# Patient Record
Sex: MALE | Race: BLACK OR AFRICAN AMERICAN
[De-identification: names, ages, dates, MRNs, and addresses within clinical notes are randomized per-mention and may not be internally consistent; named-entity substitution may affect disease eponyms.]

---

## 2021-02-06 ENCOUNTER — HOSPITAL ENCOUNTER (INPATIENT)
Dept: HOSPITAL 72 - EMR | Age: 81
LOS: 2 days | Discharge: LEFT BEFORE DISCHARGE | DRG: 194 | End: 2021-02-08
Payer: SELF-PAY

## 2021-02-06 VITALS — SYSTOLIC BLOOD PRESSURE: 125 MMHG | DIASTOLIC BLOOD PRESSURE: 80 MMHG

## 2021-02-06 VITALS — DIASTOLIC BLOOD PRESSURE: 83 MMHG | SYSTOLIC BLOOD PRESSURE: 128 MMHG

## 2021-02-06 VITALS — SYSTOLIC BLOOD PRESSURE: 130 MMHG | DIASTOLIC BLOOD PRESSURE: 90 MMHG

## 2021-02-06 VITALS — HEIGHT: 66 IN | BODY MASS INDEX: 31.34 KG/M2 | WEIGHT: 195 LBS

## 2021-02-06 DIAGNOSIS — Z88.8: ICD-10-CM

## 2021-02-06 DIAGNOSIS — J18.9: Primary | ICD-10-CM

## 2021-02-06 DIAGNOSIS — I24.9: ICD-10-CM

## 2021-02-06 DIAGNOSIS — I25.10: ICD-10-CM

## 2021-02-06 DIAGNOSIS — Z79.82: ICD-10-CM

## 2021-02-06 DIAGNOSIS — I10: ICD-10-CM

## 2021-02-06 DIAGNOSIS — R09.02: ICD-10-CM

## 2021-02-06 DIAGNOSIS — Z95.5: ICD-10-CM

## 2021-02-06 DIAGNOSIS — N40.0: ICD-10-CM

## 2021-02-06 LAB
ADD MANUAL DIFF: NO
ALBUMIN SERPL-MCNC: 3 G/DL (ref 3.4–5)
ALBUMIN/GLOB SERPL: 0.9 {RATIO} (ref 1–2.7)
ALP SERPL-CCNC: 82 U/L (ref 46–116)
ALT SERPL-CCNC: 19 U/L (ref 12–78)
ANION GAP SERPL CALC-SCNC: 5 MMOL/L (ref 5–15)
APTT BLD: 21 SEC (ref 23–33)
AST SERPL-CCNC: 15 U/L (ref 15–37)
BASOPHILS NFR BLD AUTO: 1.8 % (ref 0–2)
BILIRUB SERPL-MCNC: 0.6 MG/DL (ref 0.2–1)
BUN SERPL-MCNC: 16 MG/DL (ref 7–18)
CALCIUM SERPL-MCNC: 9.5 MG/DL (ref 8.5–10.1)
CHLORIDE SERPL-SCNC: 109 MMOL/L (ref 98–107)
CO2 SERPL-SCNC: 29 MMOL/L (ref 21–32)
CREAT SERPL-MCNC: 1.2 MG/DL (ref 0.55–1.3)
EOSINOPHIL NFR BLD AUTO: 0.9 % (ref 0–3)
ERYTHROCYTE [DISTWIDTH] IN BLOOD BY AUTOMATED COUNT: 17.2 % (ref 11.6–14.8)
GLOBULIN SER-MCNC: 3.2 G/DL
HCT VFR BLD CALC: 35.5 % (ref 42–52)
HGB BLD-MCNC: 10.6 G/DL (ref 14.2–18)
INR PPP: 1 (ref 0.9–1.1)
LYMPHOCYTES NFR BLD AUTO: 26.8 % (ref 20–45)
MCV RBC AUTO: 77 FL (ref 80–99)
MONOCYTES NFR BLD AUTO: 9.3 % (ref 1–10)
NEUTROPHILS NFR BLD AUTO: 61.2 % (ref 45–75)
PLATELET # BLD: 249 K/UL (ref 150–450)
POTASSIUM SERPL-SCNC: 4.2 MMOL/L (ref 3.5–5.1)
RBC # BLD AUTO: 4.58 M/UL (ref 4.7–6.1)
SODIUM SERPL-SCNC: 143 MMOL/L (ref 136–145)
WBC # BLD AUTO: 6.8 K/UL (ref 4.8–10.8)

## 2021-02-06 PROCEDURE — 96372 THER/PROPH/DIAG INJ SC/IM: CPT

## 2021-02-06 PROCEDURE — 82962 GLUCOSE BLOOD TEST: CPT

## 2021-02-06 PROCEDURE — 85730 THROMBOPLASTIN TIME PARTIAL: CPT

## 2021-02-06 PROCEDURE — 85025 COMPLETE CBC W/AUTO DIFF WBC: CPT

## 2021-02-06 PROCEDURE — 83690 ASSAY OF LIPASE: CPT

## 2021-02-06 PROCEDURE — 84484 ASSAY OF TROPONIN QUANT: CPT

## 2021-02-06 PROCEDURE — 80053 COMPREHEN METABOLIC PANEL: CPT

## 2021-02-06 PROCEDURE — 36415 COLL VENOUS BLD VENIPUNCTURE: CPT

## 2021-02-06 PROCEDURE — 93005 ELECTROCARDIOGRAM TRACING: CPT

## 2021-02-06 PROCEDURE — 83880 ASSAY OF NATRIURETIC PEPTIDE: CPT

## 2021-02-06 PROCEDURE — 96374 THER/PROPH/DIAG INJ IV PUSH: CPT

## 2021-02-06 PROCEDURE — 71045 X-RAY EXAM CHEST 1 VIEW: CPT

## 2021-02-06 PROCEDURE — 93306 TTE W/DOPPLER COMPLETE: CPT

## 2021-02-06 PROCEDURE — 99285 EMERGENCY DEPT VISIT HI MDM: CPT

## 2021-02-06 PROCEDURE — 85379 FIBRIN DEGRADATION QUANT: CPT

## 2021-02-06 PROCEDURE — 85610 PROTHROMBIN TIME: CPT

## 2021-02-06 RX ADMIN — HEPARIN SODIUM SCH UNITS: 5000 INJECTION INTRAVENOUS; SUBCUTANEOUS at 21:42

## 2021-02-06 RX ADMIN — SODIUM CHLORIDE SCH MLS/HR: 0.9 INJECTION INTRAVENOUS at 22:53

## 2021-02-06 RX ADMIN — HYDROCODONE BITARTRATE AND ACETAMINOPHEN ONE TAB: 5; 325 TABLET ORAL at 18:53

## 2021-02-06 RX ADMIN — DIPHENHYDRAMINE HYDROCHLORIDE PRN MG: 50 INJECTION INTRAMUSCULAR; INTRAVENOUS at 21:44

## 2021-02-06 RX ADMIN — HYDROCODONE BITARTRATE AND ACETAMINOPHEN ONE TAB: 5; 325 TABLET ORAL at 19:29

## 2021-02-06 RX ADMIN — AZITHROMYCIN DIHYDRATE SCH MLS/HR: 500 INJECTION, POWDER, LYOPHILIZED, FOR SOLUTION INTRAVENOUS at 22:54

## 2021-02-06 NOTE — DIAGNOSTIC IMAGING REPORT
EXAM:

  XR Chest, 1 View

 

CLINICAL HISTORY:

  CP

 

TECHNIQUE:

  Frontal view of the chest.

 

COMPARISON:

  No relevant prior studies available.

 

FINDINGS:

  Lungs:  Left basilar atelectasis versus infiltrate.

  Pleural space:  No pleural effusion. No pneumothorax.

  Heart:  Unremarkable.  No cardiomegaly.

 

IMPRESSION:     

  Left basilar airspace opacity the differential for which is atelectasis,

 infection, and aspiration.

## 2021-02-06 NOTE — EMERGENCY ROOM REPORT
History of Present Illness


General


Chief Complaint:  Chest Pain


Source:  Patient





Present Illness


HPI


Patient is an 80-year-old male presents for increased chest discomfort.  Reports

having onset of symptoms approximately 1 hour prior to arrival.  Prior history 

of coronary artery disease.  He states he is currently a smoker.  States he has 

been having pain for approximately 1 hour.  Reports having radiation to his neck

and jaw.  Reports having previous cardiac stent.  His primary care physician is 

in [place].  Denies any vomiting or diarrhea.  No recent cough.  Had 

negative coronavirus testing prior to flying.  Previous history of DVT and 

pulmonary embolism.


Allergies:  


Coded Allergies:  


     IODINE (Verified  Allergy, Unknown, 2/6/21)


     KETOROLAC (Verified  Allergy, Unknown, 2/6/21)


     NITROGLYCERIN (Verified  Allergy, Unknown, 2/6/21)





COVID-19 Screening


Contact w/high risk pt:  No


Experienced COVID-19 symptoms?:  No


COVID-19 Testing performed PTA:  No





Patient History


Past Medical History:  see triage record


Reviewed Nursing Documentation:  PMH: Agreed; PSxH: Agreed





Nursing Documentation-PMH


Past Medical History:  No History, Except For


Hx Cardiac Problems:  Yes - PE, 2 HEART MI, 2 STENTS


Hx Pacemaker:  No - URINARY ISSUES





Review of Systems


All Other Systems:  negative except mentioned in HPI





Physical Exam





Vital Signs








  Date Time  Temp Pulse Resp B/P (MAP) Pulse Ox O2 Delivery O2 Flow Rate FiO2


 


2/6/21 16:49 98.2 86 16 130/97 (108) 95 Room Air  








Sp02 EP Interpretation:  reviewed, normal


General Appearance:  normal inspection, well appearing, no apparent distress, 

alert, GCS 15, non-toxic


Head:  atraumatic


ENT:  normal ENT inspection, hearing grossly normal, normal voice


Neck:  normal inspection, full range of motion, supple, no bony tend


Respiratory:  normal inspection, lungs clear, normal breath sounds, no 

respiratory distress, no retraction, no wheezing


Cardiovascular #1:  regular rate, rhythm, no edema


Gastrointestinal:  normal inspection, normal bowel sounds, non tender, soft, no 

guarding, no hernia


Genitourinary:  no CVA tenderness


Musculoskeletal:  normal inspection, back normal, normal range of motion


Neurologic:  alert, motor strength/tone normal, CNs III-XII nml as tested, 

oriented x3, responsive, speech normal, normal inspection


Psychiatric:  normal inspection, judgement/insight normal, mood/affect normal


Skin:  no rash, warm/dry


Lymphatic:  normal inspection





Medical Decision Making


Diagnostic Impression:  


   Primary Impression:  


   Chest pain


   Additional Impressions:  


   Pneumonitis


   Elevated d-dimer


ER Course


Patient presents for chest pain.  Differential diagnosis include was not limited

to myocardial infarction, pulmonary embolism, coronavirus infection, pneumonia 

among others.  Because of complexity of patient's case laboratory tests and 

imaging studies were ordered.Initial laboratory testing was unremarkable.  Chest

x-ray read by radiology showed questionable left-sided infiltrate versus 

atelectasis.  EKG interpreted by me showed normal sinus rhythm without acute ST 

or T wave changes.  Troponin was 0.  Patient was given Lovenox as well as 

Plavix.  Dr. Freddy Tipton was contacted for inpatient management due to the 

patient's concerning symptoms consistent with possible cardiac chest pain.  

Patient does have multiple medication allergies to medications which are 

including nitroglycerin and Toradol.  Patient does not appear to have any 

evidence of acute distress despite complaints of pain.  Covid testing was sent 

due to patient's recent travel.  D-dimer was slightly elevated.  Patient began 

requesting Dilaudid which does not appear to be indicated given the patient's 

level of distress and symptomatology.





Labs








Test


 2/6/21


17:14 2/6/21


17:43


 


White Blood Count


 6.8 K/UL


(4.8-10.8) 





 


Red Blood Count


 4.58 M/UL


(4.70-6.10) 





 


Hemoglobin


 10.6 G/DL


(14.2-18.0) 





 


Hematocrit


 35.5 %


(42.0-52.0) 





 


Mean Corpuscular Volume 77 FL (80-99)  


 


Mean Corpuscular Hemoglobin


 23.2 PG


(27.0-31.0) 





 


Mean Corpuscular Hemoglobin


Concent 29.9 G/DL


(32.0-36.0) 





 


Red Cell Distribution Width


 17.2 %


(11.6-14.8) 





 


Platelet Count


 249 K/UL


(150-450) 





 


Mean Platelet Volume


 6.4 FL


(6.5-10.1) 





 


Neutrophils (%) (Auto)


 61.2 %


(45.0-75.0) 





 


Lymphocytes (%) (Auto)


 26.8 %


(20.0-45.0) 





 


Monocytes (%) (Auto)


 9.3 %


(1.0-10.0) 





 


Eosinophils (%) (Auto)


 0.9 %


(0.0-3.0) 





 


Basophils (%) (Auto)


 1.8 %


(0.0-2.0) 





 


Prothrombin Time


 10.7 SEC


(9.30-11.50) 





 


Prothromb Time International


Ratio 1.0 (0.9-1.1) 


 





 


Activated Partial


Thromboplast Time 21 SEC (23-33) 


 





 


D-Dimer


 1.98 mg/L FEU


(0.00-0.49) 





 


Sodium Level


 143 MMOL/L


(136-145) 





 


Potassium Level


 4.2 MMOL/L


(3.5-5.1) 





 


Chloride Level


 109 MMOL/L


() 





 


Carbon Dioxide Level


 29 MMOL/L


(21-32) 





 


Anion Gap


 5 mmol/L


(5-15) 





 


Blood Urea Nitrogen


 16 mg/dL


(7-18) 





 


Creatinine


 1.2 MG/DL


(0.55-1.30) 





 


Estimat Glomerular Filtration


Rate > 60 mL/min


(>60) 





 


Glucose Level


 109 MG/DL


() 





 


Calcium Level


 9.5 MG/DL


(8.5-10.1) 





 


Total Bilirubin


 0.6 MG/DL


(0.2-1.0) 





 


Aspartate Amino Transf


(AST/SGOT) 15 U/L (15-37) 


 





 


Alanine Aminotransferase


(ALT/SGPT) 19 U/L (12-78) 


 





 


Alkaline Phosphatase


 82 U/L


() 





 


Troponin I


 0.000 ng/mL


(0.000-0.056) 





 


Pro-B-Type Natriuretic Peptide


 65 pg/mL


(0-125) 





 


Total Protein


 6.2 G/DL


(6.4-8.2) 





 


Albumin


 3.0 G/DL


(3.4-5.0) 





 


Globulin 3.2 g/dL  


 


Albumin/Globulin Ratio 0.9 (1.0-2.7)  


 


Lipase


 69 U/L


() 





 


POC Whole Blood Glucose


 


 94 MG/DL


()








EKG Diagnostic Results


Rate:  normal


Rhythm:  NSR


ST Segments:  no acute changes





Last Vital Signs








  Date Time  Temp Pulse Resp B/P (MAP) Pulse Ox O2 Delivery O2 Flow Rate FiO2


 


2/6/21 16:49 98.2 86 16 130/97 (108) 95 Room Air  








Status:  unchanged


Disposition:  ADMITTED AS INPATIENT


Condition:  Stable











Mj Zavala MD                Feb 6, 2021 16:59

## 2021-02-06 NOTE — NUR
ED Nurse Note: Recieved care from Finesse BUENROSTRO. Pt is resting, still co of chest 
pain. Pt has stable vitals as documented on RA.

## 2021-02-06 NOTE — HISTORY AND PHYSICAL REPORT
DATE OF ADMISSION:  02/06/2021

HISTORY OF PRESENT ILLNESS:  This is an 80-year-old  male

who came to the emergency room for having chest pain and shortness of

breath for one day prior to admission.  The [place]

and he was feeling short of breath for one day prior to admission.  He

also has chest discomfort.



PAST MEDICAL HISTORY:  Coronary artery disease, status post stent

placement, hyperlipidemia, BPH.



MEDICATIONS:  He is taking aspirin, atenolol, Flomax.



ALLERGIES:  NKA.



FAMILY HISTORY:  Noncontributory.



SOCIAL HISTORY:  Lives in the [place].  .  No smoking.  No

drinking.



REVIEW OF SYSTEMS:  Generalized weakness, recurrent chest pain, asking pain

medication.



PHYSICAL EXAMINATION:

VITAL SIGNS:  Blood pressure is 124/70, pulse 84, respirations 18, no

fever.

HEENT:  NAD.

CHEST:  Bilaterally clear.

CARDIOVASCULAR:  Regular rhythm.  No gallop.  No murmur.

ABDOMEN:  Soft.  Positive bowel sounds.  Nontender.

EXTREMITIES:  No edema.

GENITOURINARY:  Deferred.



LABORATORY DATA:  His troponins are negative.  EKG is normal.  His chest

x-ray is showing pneumonia.



ASSESSMENT AND PLAN:

1. Chest pain.

2. Hypoxia.

3. Acute coronary syndrome.

4. Hypertension.

5. BPH.

6. Pain syndrome.



We will admit on a telemetry bed, rule out of MI, consider Cardiology

consult, continue aspirin and beta-blocker, added Dilaudid for severe

pain, also added antibiotic Rocephin and Zithromax, consider Pulmonary

consult as well as Cardiology consult.









  ______________________________________________

  Freddy Tipton M.D.





DR:  Nereida

D:  02/06/2021 20:56

T:  02/06/2021 21:28

JOB#:  [id number]

CC:

## 2021-02-06 NOTE — NUR
NURSE NOTES:

Patient received from Atif BUENROSTRO. Brought in by keyana, was able to ambulate to bed. Alert and 
oriented x4. Belongings accounted for. Cardiac monitor and gown placed. Vital signs WNL. Bed 
in lowest position and locked. Call light and bedside table within reach.

## 2021-02-07 VITALS — SYSTOLIC BLOOD PRESSURE: 124 MMHG | DIASTOLIC BLOOD PRESSURE: 70 MMHG

## 2021-02-07 VITALS — DIASTOLIC BLOOD PRESSURE: 90 MMHG | SYSTOLIC BLOOD PRESSURE: 131 MMHG

## 2021-02-07 VITALS — SYSTOLIC BLOOD PRESSURE: 147 MMHG | DIASTOLIC BLOOD PRESSURE: 90 MMHG

## 2021-02-07 VITALS — SYSTOLIC BLOOD PRESSURE: 125 MMHG | DIASTOLIC BLOOD PRESSURE: 89 MMHG

## 2021-02-07 VITALS — DIASTOLIC BLOOD PRESSURE: 70 MMHG | SYSTOLIC BLOOD PRESSURE: 102 MMHG

## 2021-02-07 VITALS — DIASTOLIC BLOOD PRESSURE: 92 MMHG | SYSTOLIC BLOOD PRESSURE: 126 MMHG

## 2021-02-07 RX ADMIN — SODIUM CHLORIDE SCH MLS/HR: 0.9 INJECTION INTRAVENOUS at 22:42

## 2021-02-07 RX ADMIN — DIPHENHYDRAMINE HYDROCHLORIDE PRN MG: 50 INJECTION INTRAMUSCULAR; INTRAVENOUS at 19:54

## 2021-02-07 RX ADMIN — DIPHENHYDRAMINE HYDROCHLORIDE PRN MG: 50 INJECTION INTRAMUSCULAR; INTRAVENOUS at 15:31

## 2021-02-07 RX ADMIN — HEPARIN SODIUM SCH UNITS: 5000 INJECTION INTRAVENOUS; SUBCUTANEOUS at 14:29

## 2021-02-07 RX ADMIN — DIPHENHYDRAMINE HYDROCHLORIDE PRN MG: 50 INJECTION INTRAMUSCULAR; INTRAVENOUS at 03:08

## 2021-02-07 RX ADMIN — DIPHENHYDRAMINE HYDROCHLORIDE PRN MG: 50 INJECTION INTRAMUSCULAR; INTRAVENOUS at 11:22

## 2021-02-07 RX ADMIN — HEPARIN SODIUM SCH UNITS: 5000 INJECTION INTRAVENOUS; SUBCUTANEOUS at 21:34

## 2021-02-07 RX ADMIN — AZITHROMYCIN DIHYDRATE SCH MLS/HR: 500 INJECTION, POWDER, LYOPHILIZED, FOR SOLUTION INTRAVENOUS at 22:43

## 2021-02-07 RX ADMIN — HEPARIN SODIUM SCH UNITS: 5000 INJECTION INTRAVENOUS; SUBCUTANEOUS at 05:34

## 2021-02-07 RX ADMIN — ASPIRIN 81 MG SCH MG: 81 TABLET ORAL at 10:21

## 2021-02-07 NOTE — NUR
NURSE

Discussed with Aries PEÑA, pt behavior of consistently reporting chest pain without any 
symptoms, pt CALM AND COMFORTABLe with no distress or grimacing. NO SOB, no diaphorases. And 
per pt allergic to Nitro. Orders for stress test and 2d echo.





please note pt refuses benny asks for tredmill, per Cardiology tech, must wait for negative 
covid result.

## 2021-02-07 NOTE — NUR
NURSE HAND-OFF REPORT: 



Important Events on Shift:[No remarkable events, continues to ask for pain meds, discussed 
with Aries PEÑA this behavior]

Patient Status: [Full]

Diet: [cardiac]



Pending Orders: [Pt refusing lexiscan, wants treadmill which is only possible after negative 
Covid ]

Pending Results/Labs:[]

Pending MD notification:[Please update Aries PEÑA about pt refusal to do medical stress test. 
]



Latest Vital Signs: Temperature 97.9 , Pulse 76 , B/P 126 /92 , Respiratory Rate 20 , O2 SAT 
96 , Room Air, O2 Flow Rate .  

Vital Sign Comment: []



EKG Rhythm: Sinus Rhythm

Rhythm change?: N 

MD Notified?: -

MD Response: 



Latest Murry Fall Score: 35  

Fall Risk: Medium Risk 

Safety Measures: Call light Within Reach, Bed Alarm Zone 2, Side Rails Side Rails x2, Bed 
position Low and Locked.

Fall Precautions: 

Patient Fall Education



Report given to [Pending Rn assignment].

-------------------------------------------------------------------------------

Addendum: 02/07/21 at 1926 by Ema Carcamo RN

-------------------------------------------------------------------------------

Report given to Leilani BUENROSTRO

## 2021-02-07 NOTE — NUR
NURSE NOTES:

Pt received from [place] BUENROSTRO. Pt in bed resting, no complaint of pain or SOB, bed low and 
locked, call light within reach.

## 2021-02-07 NOTE — NUR
NURSE NOTES:

Report received from PORTER Boo. Patient is awake on bed, alert and oriented x 4 in stable 
condition. Cardiac monitor is in place, shows sinus rhythm. On room air with no shortness of 
breath reported. On cardiac diet, instructed and amenable. IV site is on left hand, g-22 
saline locked that is patent and intact. Covid test is still pending, isolation protocol 
maintained and observed. Safety measures are in place, bed in lowest and locked position, 
side rails up x 2, call light button and bedside table within reach, instructed to call for 
any assistance needed, will continue plan of care.

## 2021-02-07 NOTE — CONSULTATION
History of Present Illness


General


Chief Complaint:  Chest Pain


Referring physician:  Dr. Tipton





Present Illness


HPI


80-year-old  male admitted through the emergency room c/o chest 

pain and shortness of breath for one day prior to admission. Troponin negative 

x2. CXR possible PNA, negative for acute HF or pulmonary edema. Chest pain 

relieved with morphine and dilaudid, pt is allergic to nitroglycerine. We are 

asked to see the pt to r/o ACS. EKG SR, no ischemic changes.


Allergies:  


Coded Allergies:  


     IODINE (Verified  Allergy, Unknown, 2/6/21)


     KETOROLAC (Verified  Allergy, Unknown, 2/6/21)


     NITROGLYCERIN (Verified  Allergy, Unknown, 2/6/21)





Medication History


Unable to Obtain Active Prescriptions or Reported Meds





Patient History


History Provided By:  Medical Record, Caregiver


Healthcare decision maker





Resuscitation status





Advanced Directive on File








Review of Systems


All Other Systems:  negative except mentioned in HPI





Physical Exam


General Appearance:  no apparent distress


HEENT:  normocephalic


Neck:  supple


Respiratory/Chest:  no respiratory distress, no accessory muscle use


Cardiovascular/Chest:  normal rate, regular rhythm


Abdomen:  soft


Extremities:  trace edema


Neurologic:  CNs II-XII grossly normal





Last 24 Hour Vital Signs








  Date Time  Temp Pulse Resp B/P (MAP) Pulse Ox O2 Delivery O2 Flow Rate FiO2


 


2/7/21 16:01 97.9       


 


2/7/21 16:00  76      


 


2/7/21 15:49 97.9 71 20 126/92 (103) 96   


 


2/7/21 12:00 97.7 73 20 131/90 (104) 99   


 


2/7/21 12:00  86      


 


2/7/21 11:52 97.4       


 


2/7/21 10:25  90  125/89    


 


2/7/21 09:00      Room Air  


 


2/7/21 08:00  90      


 


2/7/21 08:00 97.4 94 20 125/89 (101) 98   


 


2/7/21 04:00  87      


 


2/7/21 04:00 97.9 79 20 102/70 (81) 94   


 


2/7/21 00:00 99.2 99 18 124/70 (88) 94   


 


2/7/21 00:00  102      


 


2/6/21 23:38      Room Air  

















Intake and Output  


 


 2/6/21 2/7/21





 19:00 07:00


 


Intake Total 0 ml 


 


Balance 0 ml 


 


  


 


Intake Oral 0 ml 


 


# Voids  2








Height (Feet):  5


Height (Inches):  6.00


Weight (Pounds):  195


Medications





Current Medications








 Medications


  (Trade)  Dose


 Ordered  Sig/Tej


 Route


 PRN Reason  Start Time


 Stop Time Status Last Admin


Dose Admin


 


 Acetaminophen


  (Tylenol)  650 mg  Q4H  PRN


 ORAL


 Mild Pain (Pain Scale 1-3)  2/6/21 21:15


 3/8/21 21:14   





 


 Acetaminophen


  (Tylenol)  650 mg  Q4H  PRN


 ORAL


 Temp >100.5  2/6/21 21:15


 3/8/21 21:14   





 


 Aspirin


  (ASA)  81 mg  DAILY


 ORAL


   2/7/21 09:00


 3/24/21 08:59  2/7/21 10:21





 


 Atenolol


  (Tenormin)  50 mg  DAILY


 ORAL


   2/7/21 09:00


 3/9/21 08:59  2/7/21 10:25





 


 Azithromycin 500


 mg/Dextrose  275 ml @ 


 275 mls/hr  Q24HRS


 IV


   2/6/21 23:00


 2/12/21 23:59  2/6/21 22:54





 


 Ceftriaxone


 Sodium 1 gm/


 Dextrose  55 ml @ 


 110 mls/hr  Q24H


 IVPB


   2/6/21 23:00


 2/13/21 22:59  2/6/21 22:53





 


 Famotidine


  (Pepcid)  20 mg  DAILY


 ORAL


   2/8/21 09:00


 5/9/21 08:59   





 


 Heparin Sodium


  (Porcine)


  (Heparin 5000


 units/ml)  5,000 units  EVERY 8  HOURS


 SUBQ


   2/6/21 22:00


 3/23/21 21:59  2/7/21 14:29





 


 Hydromorphone HCl


  (Dilaudid)  1 mg  Q4H  PRN


 IVP


 Severe Pain (Pain Scale 7-10)  2/6/21 21:15


 2/13/21 21:14  2/7/21 19:54





 


 Oxycodone/


 Acetaminophen


  (Percocet 10/325)  1 tab  Q4H  PRN


 ORAL


 Moderate Pain (Pain Scale 4-6)  2/6/21 21:15


 2/13/21 21:14   





 


 Regadenoson


  (Lexiscan)  0.4 mg  ONCE  PRN


 IV


 PROCEDURE  2/7/21 14:45


 2/9/21 14:44   





 


 Tamsulosin HCl


  (Flomax)  0.4 mg  BEDTIME


 ORAL


   2/7/21 21:00


 3/9/21 20:59   














Assessment/Plan


Assessment/Plan:


1. Chest pain - atypical, non-exertional


   troponin negative


   allergic to nitro


2. Acute PNA and hypoxia


3. HTN


4. BPH





Trend troponin and repeat EKG. Echo pending to assess LV function. Recommend 

Lexiscan to r/o ischemic CM as tolerated. Started on IV abx for possible PNA. 

Currently hemodynamically stable.











Arlette Chris PA-C                Feb 7, 2021 21:25

## 2021-02-07 NOTE — NUR
NURSE HAND-OFF REPORT: 



Important Events on Shift:[Admission]

Patient Status: [FC, A&O x4]

Diet: [Cardiac Diet]



Pending Orders: []

Pending Results/Labs:[]

Pending MD notification:[]



Latest Vital Signs: Temperature 97.9 , Pulse 87 , B/P 102 /70 , Respiratory Rate 20 , O2 SAT 
94 , Room Air, O2 Flow Rate .  

Vital Sign Comment: []



EKG Rhythm: Sinus Rhythm

Rhythm change?: N 

MD Notified?: -

MD Response: 



Latest Murry Fall Score: 35  

Fall Risk: Medium Risk 

Safety Measures: Call light Within Reach, Bed Alarm Zone 1, Side Rails Side Rails x2, Bed 
position Low and Locked.

Fall Precautions: 

Yellow Socks

Yellow Gown

Door Sign

Patient Fall Education



Report given to [].

-------------------------------------------------------------------------------

Addendum: 02/07/21 at 0702 by Shirley Wasserman RN

-------------------------------------------------------------------------------

Patient report given to Ema Coleman RN

## 2021-02-07 NOTE — GENERAL PROGRESS NOTE
Subjective


Date patient seen:  Feb 7, 2021


Constitutional:  Reports: no symptoms


HEENT:  Reports: no symptoms


Cardiovascular:  Reports: chest pain


Respiratory:  Reports: shortness of breath


Gastrointestinal/Abdominal:  Reports: no symptoms


Genitourinary:  Reports: no symptoms


Neurologic/Psychiatric:  Reports: no symptoms


Endocrine:  Reports: no symptoms


Allergies:  


Coded Allergies:  


     IODINE (Verified  Allergy, Unknown, 2/6/21)


     KETOROLAC (Verified  Allergy, Unknown, 2/6/21)


     NITROGLYCERIN (Verified  Allergy, Unknown, 2/6/21)


Subjective


doing ok


rec cp





Objective





Last 24 Hour Vital Signs








  Date Time  Temp Pulse Resp B/P (MAP) Pulse Ox O2 Delivery O2 Flow Rate FiO2


 


2/7/21 12:00 97.7 73 20 131/90 (104) 99   


 


2/7/21 12:00  86      


 


2/7/21 11:52 97.4       


 


2/7/21 10:25  90  125/89    


 


2/7/21 09:00      Room Air  


 


2/7/21 08:00  90      


 


2/7/21 08:00 97.4 94 20 125/89 (101) 98   


 


2/7/21 04:00  87      


 


2/7/21 04:00 97.9 79 20 102/70 (81) 94   


 


2/7/21 00:00 99.2 99 18 124/70 (88) 94   


 


2/7/21 00:00  102      


 


2/6/21 23:38      Room Air  


 


2/6/21 20:00  81      


 


2/6/21 19:50 98.6 76 16 126/83 99   


 


2/6/21 19:31 98.6 75 18 125/80 100 Room Air  100


 


2/6/21 18:10  78 18 128/83 100 Room Air  


 


2/6/21 18:00  80 20   Room Air  100


 


2/6/21 17:56  83 18 130/90 100 Room Air  


 


2/6/21 16:49 98.2 86 16 130/97 (108) 95 Room Air  

















Intake and Output  


 


 2/6/21 2/7/21





 19:00 07:00


 


Intake Total 0 ml 


 


Balance 0 ml 


 


  


 


Intake Oral 0 ml 


 


# Voids  2








Laboratory Tests


2/6/21 17:14: 


White Blood Count 6.8, Red Blood Count 4.58L, Hemoglobin 10.6L, Hematocrit 35.5L

, Mean Corpuscular Volume 77L, Mean Corpuscular Hemoglobin 23.2L, Mean 

Corpuscular Hemoglobin Concent 29.9L, Red Cell Distribution Width 17.2H, 

Platelet Count 249, Mean Platelet Volume 6.4L, Neutrophils (%) (Auto) 61.2, 

Lymphocytes (%) (Auto) 26.8, Monocytes (%) (Auto) 9.3, Eosinophils (%) (Auto) 

0.9, Basophils (%) (Auto) 1.8, Prothrombin Time 10.7, Prothromb Time 

International Ratio 1.0, Activated Partial Thromboplast Time 21L, D-Dimer 1.98H,

Sodium Level 143, Potassium Level 4.2, Chloride Level 109H, Carbon Dioxide Level

29, Anion Gap 5, Blood Urea Nitrogen 16, Creatinine 1.2, Estimat Glomerular Yousif

tration Rate > 60, Glucose Level 109H, Calcium Level 9.5, Total Bilirubin 0.6, 

Aspartate Amino Transf (AST/SGOT) 15, Alanine Aminotransferase (ALT/SGPT) 19, 

Alkaline Phosphatase 82, Troponin I 0.000, Pro-B-Type Natriuretic Peptide 65, 

Total Protein 6.2L, Albumin 3.0L, Globulin 3.2, Albumin/Globulin Ratio 0.9L, 

Lipase 69L


2/6/21 17:43: POC Whole Blood Glucose 94


2/6/21 19:42: Troponin I 0.003


Height (Feet):  5


Height (Inches):  6.00


Weight (Pounds):  195


General Appearance:  alert


EENT:  PERRL/EOMI


Neck:  normal alignment


Cardiovascular:  regular rhythm


Respiratory/Chest:  normal breath sounds, crackles/rales


Abdomen:  non tender, soft


Extremities:  non-tender


Neurologic:  CNs II-XII grossly normal





Assessment/Plan


Assessment/Plan:


acs


cad s/p angiopalsty


htn


bph


pneumonia


iv abx


asa


b blocker


cardio on case











Tong Tipton MD              Feb 7, 2021 14:28

## 2021-02-08 VITALS — SYSTOLIC BLOOD PRESSURE: 125 MMHG | DIASTOLIC BLOOD PRESSURE: 80 MMHG

## 2021-02-08 VITALS — SYSTOLIC BLOOD PRESSURE: 123 MMHG | DIASTOLIC BLOOD PRESSURE: 98 MMHG

## 2021-02-08 VITALS — SYSTOLIC BLOOD PRESSURE: 138 MMHG | DIASTOLIC BLOOD PRESSURE: 95 MMHG

## 2021-02-08 RX ADMIN — HEPARIN SODIUM SCH UNITS: 5000 INJECTION INTRAVENOUS; SUBCUTANEOUS at 05:43

## 2021-02-08 RX ADMIN — ASPIRIN 81 MG SCH MG: 81 TABLET ORAL at 08:30

## 2021-02-08 RX ADMIN — DIPHENHYDRAMINE HYDROCHLORIDE PRN MG: 50 INJECTION INTRAMUSCULAR; INTRAVENOUS at 08:32

## 2021-02-08 RX ADMIN — DIPHENHYDRAMINE HYDROCHLORIDE PRN MG: 50 INJECTION INTRAMUSCULAR; INTRAVENOUS at 00:06

## 2021-02-08 RX ADMIN — DIPHENHYDRAMINE HYDROCHLORIDE PRN MG: 50 INJECTION INTRAMUSCULAR; INTRAVENOUS at 04:32

## 2021-02-08 NOTE — NUR
CASE MANAGEMENT:REVIEW



60 YR OLD MALE PRESENTED TO ER

FROM [place]



CC: CHEST PAIN  RADIATING TO SHOULDERS



PMH: MI. PE



SI: ACS. PNEUMONITIS. ELEVATED D-DIMER

98.3   86  16  130/97  95% ON RA

H/H-10.6/35.5      TROPONIN(-)



IS: ASA PO X1

PLAVIX PO X1

IV ZOFRAN X1

COVID SWAB

CXR

**: TO TELEMETRY 

DCP: RETURN TO PRIOR LIVING ARRANGEMENTS

## 2021-02-08 NOTE — NUR
NURSE NOTES:

Patient was consistently demanding on his pain medication and he wants it to be given on 
time despite of explaining that it is not a schedule.

## 2021-02-08 NOTE — NUR
NURSE NOTES:

Patient received from PORTER Duke. Patient seen in bed in high fowlers position AAOx4, 
watching television  with no acute signs of distress. The patient is on room air with oxygen 
saturation within normal limits. Patient has a L hand 22G IV that is saline locked, clean, 
patent and intact. The patients bed is in lowest position, locked, side rails x3 and call 
light within reach. Patient instructed to press call light for any further needs.

## 2021-02-08 NOTE — NUR
NURSE HAND-OFF REPORT: 



Important Events on Shift: Patient has been asking for his dilaudid every 4 hours and no 
evident signs of chest pain noted.

Patient Status: Patient is awake on bed in stable condition. Plan of care endorsed.

Diet: Cardiac diet



Pending Orders: lexiscan

Pending Results/Labs:AM lab result

Pending MD notification:none



Latest Vital Signs: Temperature 98.7 , Pulse 88 , B/P 138 /95 , Respiratory Rate 20 , O2 SAT 
96 , Room Air, O2 Flow Rate .  

Vital Sign Comment: stable



EKG Rhythm: Sinus Rhythm

Rhythm change?: N 

MD Notified?: -

MD Response: 



Latest Murry Fall Score: 35  

Fall Risk: Medium Risk 

Safety Measures: Call light Within Reach, Bed Alarm Zone 2, Side Rails Side Rails x2, Bed 
position Low and Locked.

Fall Precautions: 

Patient Fall Education



Report given to PORTER Joaquin.

## 2021-02-08 NOTE — NUR
NURSE NOTES:

Went into room, patient had disconnected cardiac monitor and was changing into own clothes. 
patient was requesting to leave and told me,"bring me the paper and ill sign it so I can 
leave" (in regards to AMA form). I educated patient on risks of leaving and educated on his 
current condition. patient stated, "I dont need the test im fine" (in regards to stress test 
scheduled). Patient is AAox4, stable VS and able to make own decisions. MD was notified and 
Nursing supervisor notified as well. After patient education patient still desired to leave. 
The patient signed the AMA form, the belongings form and IV was discontinued and was clean 
and intact. One the IV site was removed patient stood up and walked off the unit on his own.

## 2021-02-10 NOTE — CARDIOLOGY REPORT
--------------- APPROVED REPORT --------------





EKG Measurement

Heart [id number]

DE [id number]

[id number]

[id number]

[id number]



<Conclusion>

Normal sinus rhythm

Normal ECG

## 2021-02-10 NOTE — DISCHARGE SUMMARY
Discharge Summary


Discharge Summary


_


Date of admission: 2/6/2021





Patient left AGAINST MEDICAL ADVICE on 2/8/2021





History of Present Illness and Brief Hospital Course


Mr. Camacho is an 80-year-old male with past medical history of CAD with stent, 

DVT, pulmonary embolism, and smoking, who presented to the ED for evaluation of 

chest discomfort x1 hour.  Patient reported having radiation to his neck and 

jaw.





Chest x-ray showed a questionable left-sided infiltrate versus atelectasis.    

EKG showed normal sinus rhythm without acute ST or T wave changes.  Troponin was

0.  Patient was given Lovenox as well as Plavix.  Patient was admitted to the 

hospital for further management.





The serial troponin was negative.  Chest x-ray was concerning for possible 

pneumonia but was negative for acute heart failure or pulmonary edema.  COVID-19

PCR was negative. Echocardiogram revealed ejection fraction of 60%.  

Echocardiogram suggested mild left ventricular diastolic dysfunction (grade 1).





Patient was noted to be asking for his Dilaudid every 4 hours without evident 

signs of chest pain.  Patient was scheduled for a stress test for further 

evaluation.  However, patient expressed his wish to leave the hospital.  Patient

was educated on risks of leaving AMA.  Patient still desired to leave.  Patient 

signed the AMA form and left the hospital.





Consultants:


Cardiology MARIANA Rodriguez





Final diagnosis


Acute coronary syndrome, atypical, nonexertional


Pneumonia


Hypoxia


Hypertension


BPH


History of CAD s/p angioplasty





I have been assigned to dictate discharge summary for this account.


I was not involved in the patient's management











Rafael Colunga                 Feb 10, 2021 15:24

## 2021-02-10 NOTE — CARDIOLOGY REPORT
--------------- APPROVED REPORT --------------





EXAM: Two-dimensional and M-mode echocardiogram with Doppler and color Doppler.



INDICATION

Acute myocardial infarction



M-Mode DIMENSIONS 

IVSd1.0 (0.7-1.1cm)Left Atrium (MM)2.0 (1.6-4.0cm)

LVDd4.3 (3.5-5.6cm)Aortic Root3.6 (2.0-3.7cm)

PWd1.0 (0.7-1.1cm)Aortic Cusp Exc.2.2 (1.5-2.0cm)

IVSs1.5 cmEPSS0.8 (>1.0cm)

LVDs2.7 (2.5-4.0cm)

PWs1.5 cm



<Conclusion>

Technically difficult study due to poor acoustic windows.

Study quality precludes accurate assessment of regional wall motion.

Normal left ventricular chamber size, systolic function and wall motion.

Left ventricular ejection fraction estimated to be 60 %.

No evidence of left ventricular hypertrophy.

No evidence of pericardial effusion. 

All other cardiac chamber sizes are within normal limits. 

Focal aortic valve sclerosis with adequate cusp excursion.

Thickened mitral valve leaflets with normal excursion.

Mild mitral annulus and aortic root calcification.

Pulmonic valve not well visualized.

Normal tricuspid valve structure.  

IVC is normal in size with physiological collapse. 



A  color flow and spectral Doppler study was performed and revealed:

Trace aortic regurgitation.

No mitral regurgitation.

Mitral diastolic velocities suggest mild left ventricular diastolic dysfunction (Grade I).  

No tricuspid regurgitation.

Tricuspid systolic velocities suggests peak right ventricular systolic pressure of 13 mmHg.